# Patient Record
Sex: FEMALE | Race: WHITE
[De-identification: names, ages, dates, MRNs, and addresses within clinical notes are randomized per-mention and may not be internally consistent; named-entity substitution may affect disease eponyms.]

---

## 2017-08-16 ENCOUNTER — HOSPITAL ENCOUNTER (OUTPATIENT)
Dept: HOSPITAL 45 - C.MAMM | Age: 42
Discharge: HOME | End: 2017-08-16
Attending: INTERNAL MEDICINE
Payer: COMMERCIAL

## 2017-08-16 DIAGNOSIS — Z12.31: Primary | ICD-10-CM

## 2017-08-17 NOTE — MAMMOGRAPHY REPORT
BILATERAL DIGITAL SCREENING MAMMOGRAM TOMOSYNTHESIS WITH CAD: 8/16/2017

CLINICAL HISTORY: Routine screening.  





TECHNIQUE:  Breast tomosynthesis in addition to standard 2D mammography was performed. Current study 
was also evaluated with a Computer Aided Detection (CAD) system.  



COMPARISON: Comparison is made to exams dated:  4/29/2016 mammogram, 4/17/2015 mammogram, and 1/29/20
14 mammogram - [facility].   



BREAST COMPOSITION:  There are scattered areas of fibroglandular density in both breasts.  



FINDINGS: There is stable asymmetry in the central left breast.  No new suspicious mass, architectura
l distortion or cluster of microcalcifications is seen.  



IMPRESSION:  ACR BI-RADS CATEGORY 1: NEGATIVE

There is no mammographic evidence of malignancy. A 1 year screening mammogram is recommended.  The pa
tient will receive written notification of the results.  





Approximately 10% of breast cancers are not detected with mammography. A negative mammographic report
 should not delay biopsy if a clinically suggestive mass is present.



Abby Scott M.D.          

ay/:8/16/2017 15:30:26  



Imaging Technologist: Sally BURR(R)(M), [facility]

letter sent: Normal 1/2  

BI-RADS Code: ACR BI-RADS Category 1: Negative

## 2018-02-08 ENCOUNTER — HOSPITAL ENCOUNTER (OUTPATIENT)
Dept: HOSPITAL 45 - C.LAB1850 | Age: 43
Discharge: HOME | End: 2018-02-08
Attending: INTERNAL MEDICINE
Payer: COMMERCIAL

## 2018-02-08 DIAGNOSIS — E11.65: Primary | ICD-10-CM

## 2018-02-08 DIAGNOSIS — E03.9: ICD-10-CM

## 2018-02-08 DIAGNOSIS — G47.62: ICD-10-CM

## 2018-02-08 LAB
ALBUMIN SERPL-MCNC: 3.4 GM/DL (ref 3.4–5)
ALP SERPL-CCNC: 112 U/L (ref 45–117)
ALT SERPL-CCNC: 37 U/L (ref 12–78)
AST SERPL-CCNC: 23 U/L (ref 15–37)
BUN SERPL-MCNC: 16 MG/DL (ref 7–18)
CALCIUM SERPL-MCNC: 8.8 MG/DL (ref 8.5–10.1)
CO2 SERPL-SCNC: 24 MMOL/L (ref 21–32)
CREAT SERPL-MCNC: 0.73 MG/DL (ref 0.6–1.2)
GLUCOSE SERPL-MCNC: 391 MG/DL (ref 70–99)
HBA1C MFR BLD: 13.9 % (ref 4.5–5.6)
KETONES UR QL STRIP: 150 MG/DL
PH UR: 220 MG/DL (ref 0–200)
POTASSIUM SERPL-SCNC: 3.6 MMOL/L (ref 3.5–5.1)
PROT SERPL-MCNC: 7.6 GM/DL (ref 6.4–8.2)
SODIUM SERPL-SCNC: 133 MMOL/L (ref 136–145)

## 2018-08-08 ENCOUNTER — HOSPITAL ENCOUNTER (OUTPATIENT)
Dept: HOSPITAL 45 - C.LAB1850 | Age: 43
Discharge: HOME | End: 2018-08-08
Attending: PHYSICIAN ASSISTANT
Payer: COMMERCIAL

## 2018-08-08 DIAGNOSIS — E03.9: ICD-10-CM

## 2018-08-08 DIAGNOSIS — E11.65: Primary | ICD-10-CM

## 2018-08-08 LAB
ALBUMIN SERPL-MCNC: 3.5 GM/DL (ref 3.4–5)
ALP SERPL-CCNC: 91 U/L (ref 45–117)
ALT SERPL-CCNC: 23 U/L (ref 12–78)
AST SERPL-CCNC: 14 U/L (ref 15–37)
BUN SERPL-MCNC: 15 MG/DL (ref 7–18)
CALCIUM SERPL-MCNC: 8.9 MG/DL (ref 8.5–10.1)
CO2 SERPL-SCNC: 27 MMOL/L (ref 21–32)
CREAT SERPL-MCNC: 0.63 MG/DL (ref 0.6–1.2)
CREAT UR-MCNC: 14.5 MG/DL
GLUCOSE SERPL-MCNC: 121 MG/DL (ref 70–99)
HBA1C MFR BLD: 7.9 % (ref 4.5–5.6)
KETONES UR QL STRIP: 132 MG/DL
PH UR: 189 MG/DL (ref 0–200)
POTASSIUM SERPL-SCNC: 3.9 MMOL/L (ref 3.5–5.1)
PROT SERPL-MCNC: 7.7 GM/DL (ref 6.4–8.2)
SODIUM SERPL-SCNC: 139 MMOL/L (ref 136–145)